# Patient Record
Sex: FEMALE | Race: BLACK OR AFRICAN AMERICAN | NOT HISPANIC OR LATINO | Employment: UNEMPLOYED | RURAL
[De-identification: names, ages, dates, MRNs, and addresses within clinical notes are randomized per-mention and may not be internally consistent; named-entity substitution may affect disease eponyms.]

---

## 2018-06-05 ENCOUNTER — HISTORICAL (OUTPATIENT)
Dept: ADMINISTRATIVE | Facility: HOSPITAL | Age: 68
End: 2018-06-05

## 2018-06-08 LAB
LAB AP COMMENTS: NORMAL
LAB AP GENERAL CAT - HISTORICAL: NORMAL
LAB AP INTERPRETATION/RESULT - HISTORICAL: NEGATIVE
LAB AP SPECIMEN ADEQUACY - HISTORICAL: NORMAL
LAB AP SPECIMEN SUBMITTED - HISTORICAL: NORMAL

## 2019-06-05 ENCOUNTER — HISTORICAL (OUTPATIENT)
Dept: ADMINISTRATIVE | Facility: HOSPITAL | Age: 69
End: 2019-06-05

## 2020-06-16 ENCOUNTER — HISTORICAL (OUTPATIENT)
Dept: ADMINISTRATIVE | Facility: HOSPITAL | Age: 70
End: 2020-06-16

## 2020-06-22 LAB
LAB AP COMMENTS: NORMAL
LAB AP GENERAL CAT - HISTORICAL: NORMAL
LAB AP INTERPRETATION/RESULT - HISTORICAL: NORMAL
LAB AP SPECIMEN ADEQUACY - HISTORICAL: NORMAL
LAB AP SPECIMEN SUBMITTED - HISTORICAL: NORMAL

## 2021-06-17 ENCOUNTER — OFFICE VISIT (OUTPATIENT)
Dept: OBSTETRICS AND GYNECOLOGY | Facility: CLINIC | Age: 71
End: 2021-06-17
Payer: COMMERCIAL

## 2021-06-17 VITALS
HEIGHT: 66 IN | SYSTOLIC BLOOD PRESSURE: 132 MMHG | WEIGHT: 178.81 LBS | DIASTOLIC BLOOD PRESSURE: 82 MMHG | BODY MASS INDEX: 28.74 KG/M2

## 2021-06-17 DIAGNOSIS — Z01.419 ENCNTR FOR GYN EXAM (GENERAL) (ROUTINE) W/O ABN FINDINGS: Primary | ICD-10-CM

## 2021-06-17 DIAGNOSIS — Z01.419 WOMEN'S ANNUAL ROUTINE GYNECOLOGICAL EXAMINATION: ICD-10-CM

## 2021-06-17 DIAGNOSIS — R87.610 ASCUS OF CERVIX WITH NEGATIVE HIGH RISK HPV: ICD-10-CM

## 2021-06-17 PROCEDURE — 99397 PR PREVENTIVE VISIT,EST,65 & OVER: ICD-10-PCS | Mod: S$PBB,,, | Performed by: OBSTETRICS & GYNECOLOGY

## 2021-06-17 PROCEDURE — 88142 CYTOPATH C/V THIN LAYER: CPT | Mod: GCY | Performed by: OBSTETRICS & GYNECOLOGY

## 2021-06-17 PROCEDURE — 99213 OFFICE O/P EST LOW 20 MIN: CPT | Mod: PBBFAC | Performed by: OBSTETRICS & GYNECOLOGY

## 2021-06-17 PROCEDURE — 99397 PER PM REEVAL EST PAT 65+ YR: CPT | Mod: S$PBB,,, | Performed by: OBSTETRICS & GYNECOLOGY

## 2021-06-21 LAB
GH SERPL-MCNC: NORMAL NG/ML
INSULIN SERPL-ACNC: NORMAL U[IU]/ML
LAB AP CLINICAL INFORMATION: NORMAL
LAB AP GYN INTERPRETATION: NEGATIVE
LAB AP PAP DISCLAIMER COMMENTS: NORMAL
RENIN PLAS-CCNC: NORMAL NG/ML/H

## 2021-07-30 LAB — IMPRESS AND RECOMMEND: NORMAL

## 2022-06-21 ENCOUNTER — OFFICE VISIT (OUTPATIENT)
Dept: OBSTETRICS AND GYNECOLOGY | Facility: CLINIC | Age: 72
End: 2022-06-21
Payer: COMMERCIAL

## 2022-06-21 VITALS
WEIGHT: 184.38 LBS | DIASTOLIC BLOOD PRESSURE: 78 MMHG | BODY MASS INDEX: 29.63 KG/M2 | SYSTOLIC BLOOD PRESSURE: 118 MMHG | HEIGHT: 66 IN

## 2022-06-21 DIAGNOSIS — Z01.419 WOMEN'S ANNUAL ROUTINE GYNECOLOGICAL EXAMINATION: Primary | ICD-10-CM

## 2022-06-21 PROCEDURE — 99397 PR PREVENTIVE VISIT,EST,65 & OVER: ICD-10-PCS | Mod: S$PBB,,, | Performed by: OBSTETRICS & GYNECOLOGY

## 2022-06-21 PROCEDURE — 99397 PER PM REEVAL EST PAT 65+ YR: CPT | Mod: S$PBB,,, | Performed by: OBSTETRICS & GYNECOLOGY

## 2022-06-21 PROCEDURE — 99213 OFFICE O/P EST LOW 20 MIN: CPT | Mod: PBBFAC | Performed by: OBSTETRICS & GYNECOLOGY

## 2022-06-21 NOTE — PATIENT INSTRUCTIONS
Discussed normal gyn examination.    She will continue colonoscopy screening as directed by Gastroenterology.    To have bone density screening done in [place].  She will continue calcium with vitamin-D supplements daily.     Continue follow-up with Dr. Shields regarding routine glucose cholesterol testing.    Continue yearly screening mammography.

## 2022-06-21 NOTE — PROGRESS NOTES
Subjective:       Patient ID: Natalia C Sam is a 71 y.o. female.    Chief Complaint: Gynecologic Exam (Last seen in June 2021 as NEW PT, here for Gyn exam, urine dipstick negative)    Presents for yearly gyn check.    No specific gyn complaints no history of postmenopausal bleeding.    She is now living in [place].  However she will probably retire in [place].  This is her home.    In [place] she had colonoscopy screen 2021 December.        Review of Systems      Objective:      Physical Exam  Exam conducted with a chaperone present.   HENT:      Head: Normocephalic.      Nose: Nose normal.   Eyes:      Pupils: Pupils are equal, round, and reactive to light.   Cardiovascular:      Rate and Rhythm: Normal rate.   Pulmonary:      Effort: Pulmonary effort is normal.      Breath sounds: Normal breath sounds.   Chest:      Comments: Breasts without palpable masses mammography screening scheduled July 2022. She will notify me if she does not receive results within 2 weeks.    Abdominal:      General: Abdomen is flat.      Palpations: Abdomen is soft.   Genitourinary:     General: Normal vulva.      Vagina: Normal.      Cervix: Normal.      Uterus: Normal.       Adnexa: Right adnexa normal and left adnexa normal.      Rectum: Normal.      Comments: Externa normal vault normal cervix normal to appearance.  Pap done 2021 was negative.  Bimanual exam revealed uterus normal size ovaries are nonpalpable on vaginal or rectovaginal examination hemoccult not done since she had colon scope December 2021  Musculoskeletal:         General: Normal range of motion.      Cervical back: Full passive range of motion without pain, normal range of motion and neck supple.      Comments: Plans to have bone density testing in [place].   Skin:     General: Skin is dry.   Neurological:      General: No focal deficit present.      Mental Status: She is alert.   Psychiatric:         Attention and Perception: Attention normal.         Mood and  Affect: Mood normal.         Assessment:       1. Women's annual routine gynecological examination        Plan:       Patient Instructions   Discussed normal gyn examination.    She will continue colonoscopy screening as directed by Gastroenterology.    To have bone density screening done in [place].  She will continue calcium with vitamin-D supplements daily.     Continue follow-up with Dr. Machado regarding routine glucose cholesterol testing.    Continue yearly screening mammography.

## 2023-06-20 ENCOUNTER — OFFICE VISIT (OUTPATIENT)
Dept: OBSTETRICS AND GYNECOLOGY | Facility: CLINIC | Age: 73
End: 2023-06-20
Payer: COMMERCIAL

## 2023-06-20 VITALS
BODY MASS INDEX: 31.08 KG/M2 | HEIGHT: 66 IN | DIASTOLIC BLOOD PRESSURE: 70 MMHG | SYSTOLIC BLOOD PRESSURE: 120 MMHG | WEIGHT: 193.38 LBS

## 2023-06-20 DIAGNOSIS — Z01.419 ENCNTR FOR GYN EXAM (GENERAL) (ROUTINE) W/O ABN FINDINGS: Primary | ICD-10-CM

## 2023-06-20 DIAGNOSIS — Z12.4 SCREENING FOR MALIGNANT NEOPLASM OF THE CERVIX: ICD-10-CM

## 2023-06-20 PROCEDURE — 99213 OFFICE O/P EST LOW 20 MIN: CPT | Mod: PBBFAC | Performed by: OBSTETRICS & GYNECOLOGY

## 2023-06-20 PROCEDURE — 87624 HPV HI-RISK TYP POOLED RSLT: CPT | Mod: ,,, | Performed by: CLINICAL MEDICAL LABORATORY

## 2023-06-20 PROCEDURE — 88142 CYTOPATH C/V THIN LAYER: CPT | Mod: TC,GCY | Performed by: OBSTETRICS & GYNECOLOGY

## 2023-06-20 PROCEDURE — 87624 HUMAN PAPILLOMAVIRUS (HPV): ICD-10-PCS | Mod: ,,, | Performed by: CLINICAL MEDICAL LABORATORY

## 2023-06-20 PROCEDURE — 99397 PER PM REEVAL EST PAT 65+ YR: CPT | Mod: S$PBB,,, | Performed by: OBSTETRICS & GYNECOLOGY

## 2023-06-20 PROCEDURE — 99397 PR PREVENTIVE VISIT,EST,65 & OVER: ICD-10-PCS | Mod: S$PBB,,, | Performed by: OBSTETRICS & GYNECOLOGY

## 2023-06-20 NOTE — PATIENT INSTRUCTIONS
Discussed normal gyn examination.      She will continue yearly screening mammography.      Continue colonoscopy screening as directed by Gastroenterology.      She will continue routine glucose cholesterol testing done by  who follows thyroid levels.      Continue calcium with vitamin-D supplements.    Notify me if any gyn problems arise.

## 2023-06-20 NOTE — PROGRESS NOTES
Subjective:       Patient ID: Natalia C Sam is a 72 y.o. female.    Chief Complaint: Annual Exam (Here for check up.)    Presents for routine gyn check.      No specific complaints.      No history of postmenopausal bleeding.      She had colonoscopy screening approximally 1 year ago.      Continue yearly screening mammography    Review of Systems      Objective:      Physical Exam  Exam conducted with a chaperone present.   HENT:      Head: Normocephalic.      Nose: Nose normal.   Eyes:      Pupils: Pupils are equal, round, and reactive to light.   Cardiovascular:      Rate and Rhythm: Normal rate.   Pulmonary:      Effort: Pulmonary effort is normal.      Breath sounds: Normal breath sounds.   Chest:      Comments: Breasts without palpable masses no skin retraction or nipple dimpling.  Mammography scheduled July 2023 at [facility].  She will notify me if she is not heard from results within 2 weeks.  Abdominal:      General: Abdomen is flat.      Palpations: Abdomen is soft.   Genitourinary:     General: Normal vulva.      Vagina: Normal.      Cervix: Normal.      Uterus: Normal.       Adnexa: Right adnexa normal and left adnexa normal.      Rectum: Normal.      Comments: External normal vault normal cervix normal to appearance Pap taken bimanual exam revealed uterus normal size ovaries are nonpalpable on vaginal or rectovaginal examination hemoccult not done since she had colonoscopy screening approximally 1 year ago.  Musculoskeletal:         General: Normal range of motion.      Cervical back: Full passive range of motion without pain, normal range of motion and neck supple.      Comments: She had bone density screening done in [place] October 2022.  She was told it was within normal limits.  She will continue calcium with vitamin-D supplements   Skin:     General: Skin is dry.   Neurological:      General: No focal deficit present.      Mental Status: She is alert.   Psychiatric:         Attention and  Perception: Attention normal.         Mood and Affect: Mood normal.       Assessment:       1. Encntr for gyn exam (general) (routine) w/o abn findings    2. Screening for malignant neoplasm of the cervix        Plan:       Patient Instructions   Discussed normal gyn examination.      She will continue yearly screening mammography.      Continue colonoscopy screening as directed by Gastroenterology.      She will continue routine glucose cholesterol testing done by  who follows thyroid levels.      Continue calcium with vitamin-D supplements.    Notify me if any gyn problems arise.

## 2023-06-23 LAB
HPV 16: NEGATIVE
HPV 18: NEGATIVE
HPV OTHER: NEGATIVE

## 2024-06-11 ENCOUNTER — OFFICE VISIT (OUTPATIENT)
Dept: OBSTETRICS AND GYNECOLOGY | Facility: CLINIC | Age: 74
End: 2024-06-11
Payer: COMMERCIAL

## 2024-06-11 VITALS
WEIGHT: 193.19 LBS | HEIGHT: 66 IN | DIASTOLIC BLOOD PRESSURE: 70 MMHG | BODY MASS INDEX: 31.05 KG/M2 | SYSTOLIC BLOOD PRESSURE: 130 MMHG

## 2024-06-11 DIAGNOSIS — Z01.419 WOMEN'S ANNUAL ROUTINE GYNECOLOGICAL EXAMINATION: Primary | ICD-10-CM

## 2024-06-11 PROCEDURE — 99999 PR PBB SHADOW E&M-EST. PATIENT-LVL III: CPT | Mod: PBBFAC,,, | Performed by: OBSTETRICS & GYNECOLOGY

## 2024-06-11 PROCEDURE — 99213 OFFICE O/P EST LOW 20 MIN: CPT | Mod: PBBFAC | Performed by: OBSTETRICS & GYNECOLOGY

## 2024-06-11 PROCEDURE — 99397 PER PM REEVAL EST PAT 65+ YR: CPT | Mod: S$PBB,,, | Performed by: OBSTETRICS & GYNECOLOGY

## 2024-06-11 NOTE — PATIENT INSTRUCTIONS
Discussed normal gyn examination.      She will continue yearly screening mammography.      Continue colonoscopy screening as directed by Gastroenterology.      She will continue routine glucose cholesterol testing by primary care provider.  Dr. Shields    Continue calcium with vitamin-D supplements.    She has bone density testing done in [place]

## 2024-06-11 NOTE — PROGRESS NOTES
Subjective:       Patient ID: Natalia DARIANA Guajardo is a 73 y.o. female.    Chief Complaint: Well Woman (Here for check up. )    Presents for routine yearly gyn check.      No specific gyn complaints she is 73 years of age no history of postmenopausal bleeding.      Review of Systems      Objective:      Physical Exam  Exam conducted with a chaperone present.   HENT:      Head: Normocephalic.      Nose: Nose normal.   Eyes:      Pupils: Pupils are equal, round, and reactive to light.   Cardiovascular:      Rate and Rhythm: Normal rate.   Pulmonary:      Effort: Pulmonary effort is normal.      Breath sounds: Normal breath sounds.   Chest:      Comments: Breasts without palpable masses no skin retraction or nipple dimpling.  She will continue yearly screening mammography    She has mammography screening at [facility].  This is been scheduled.  Abdominal:      General: Abdomen is flat.      Palpations: Abdomen is soft.   Genitourinary:     General: Normal vulva.      Vagina: Normal.      Cervix: Normal.      Uterus: Normal.       Adnexa: Right adnexa normal and left adnexa normal.      Rectum: Normal.      Comments: External normal vault normal cervix normal to appearance.  Previous Pap 2023- with negative HPV therefore Pap not repeated bimanual exam revealed uterus normal size no adnexal masses noted on vaginal or rectovaginal examination.  Colonoscopy was done 2022 therefore hemoccult not done today.    Musculoskeletal:         General: Normal range of motion.      Cervical back: Full passive range of motion without pain, normal range of motion and neck supple.   Skin:     General: Skin is dry.   Neurological:      General: No focal deficit present.      Mental Status: She is alert.   Psychiatric:         Attention and Perception: Attention normal.         Mood and Affect: Mood normal.         Assessment:       1. Women's annual routine gynecological examination        Plan:       Patient Instructions    Discussed normal gyn examination.      She will continue yearly screening mammography.      Continue colonoscopy screening as directed by Gastroenterology.      She will continue routine glucose cholesterol testing by primary care provider.  Dr. Shields    Continue calcium with vitamin-D supplements.    She has bone density testing done in [place]